# Patient Record
Sex: FEMALE
[De-identification: names, ages, dates, MRNs, and addresses within clinical notes are randomized per-mention and may not be internally consistent; named-entity substitution may affect disease eponyms.]

---

## 2017-12-12 ENCOUNTER — HOSPITAL ENCOUNTER (OUTPATIENT)
Dept: HOSPITAL 39 - GMAB | Age: 82
Discharge: HOME | End: 2017-12-12
Attending: FAMILY MEDICINE
Payer: MEDICARE

## 2017-12-12 DIAGNOSIS — I10: Primary | ICD-10-CM

## 2017-12-18 ENCOUNTER — HOSPITAL ENCOUNTER (OUTPATIENT)
Dept: HOSPITAL 39 - MAMMO | Age: 82
Discharge: HOME | End: 2017-12-18
Attending: FAMILY MEDICINE
Payer: MEDICARE

## 2017-12-18 DIAGNOSIS — E04.1: ICD-10-CM

## 2017-12-18 DIAGNOSIS — I65.23: Primary | ICD-10-CM

## 2017-12-18 PROCEDURE — 93880 EXTRACRANIAL BILAT STUDY: CPT

## 2017-12-18 PROCEDURE — 77063 BREAST TOMOSYNTHESIS BI: CPT

## 2017-12-18 PROCEDURE — 76536 US EXAM OF HEAD AND NECK: CPT

## 2017-12-19 NOTE — US
EXAM DESCRIPTION: 



Carotid Duplex



CLINICAL HISTORY: 



82-year-old female with carotid bruit.



COMPARISON: 



None Available



TECHNIQUE: 



Color Doppler evaluation of the extracranial carotid



FINDINGS: 



Right carotid: By 2-D imaging, there is a moderate burden of

calcified plaque seen within the common carotid artery extending

into the right internal carotid and external carotid vessels.

Peak systolic velocity common carotid artery = 85.3 cm/s

Peak systolic velocity internal carotid artery = 106.4 cm/s

Peak systolic velocity external carotid artery = 123.3 cm/s

ICA CCA ratio 1.2

Left carotid: By 2-D imaging, there is a moderate burden of

calcified plaque seen within the common carotid artery/bulb

extending into the internal carotid artery.

Peak systolic velocity common carotid artery = 82.0 cm/s

Peak systolic velocity internal carotid artery = 76.6 cm/s 

Peak systolic velocity external carotid artery = 69.1 cm/s

ICA CCA ratio 0.9

Antegrade flow is seen in both vertebral arteries

Normal flow velocities and waveforms are demonstrated

bilaterally. 





IMPRESSION: 



Moderate burden of calcified atherosclerotic disease bilateral

common carotid arteries extending into the internal carotid

arteries. No hemodynamically significant rate limiting stenosis

is present by NASCET Criteria.



Electronically signed by:  Tanmay Chapman MD  12/19/2017 8:22 AM

[facility] Workstation: [id number]

## 2017-12-19 NOTE — US
Thyroid Ultrasound Examination



Clinical History:



82 years Female, NODULE



Comparison



[None. ]



Findings



Thyroid gland is normal in size. Multiple thyroid nodules are

present bilaterally. None of these thyroid nodules meet imaging

criteria for pathologic correlation. The largest is within the

left lobe measuring 1.9 cm. This dominant nodule is

well-circumscribed, spongiform and without internal

calcifications-benign. Thyroid isthmus is normal in thickness.



Cervical lymphadenopathy:None. 



Impression



Multinodular goiter.

No concerning thyroid nodules in need of follow-up or pathologic

correlation.



Electronically signed by:  Tanmay Chapman MD  12/19/2017 8:26 AM

[facility] Workstation: [id number]

## 2018-08-27 ENCOUNTER — HOSPITAL ENCOUNTER (OUTPATIENT)
Dept: HOSPITAL 39 - AMB | Age: 83
Discharge: HOME | End: 2018-08-27
Attending: OPHTHALMOLOGY
Payer: MEDICARE

## 2018-08-27 DIAGNOSIS — H26.492: Primary | ICD-10-CM

## 2019-03-06 ENCOUNTER — HOSPITAL ENCOUNTER (OUTPATIENT)
Dept: HOSPITAL 39 - MAMMO | Age: 84
End: 2019-03-06
Attending: FAMILY MEDICINE
Payer: MEDICARE

## 2019-03-06 DIAGNOSIS — I10: ICD-10-CM

## 2019-03-06 DIAGNOSIS — Z12.31: Primary | ICD-10-CM

## 2019-03-07 NOTE — MAM
EXAM DESCRIPTION: 

3D Screening BILATERAL : Digital Mammography.



CLINICAL HISTORY: 

84 years Female SCREENING . No complaints. No personal or family

history of breast cancer. No childbirth. Postmenopausal. No HRT. 

Lifetime risk of developing breast cancer (Tyrer-Cuzick

model)(%):  1.9.



COMPARISON: 

Bilateral screening digital breast tomosynthesis 12/18/2017.  



TECHNIQUE: 

Bilateral CC and MLO projection full-field images, digital

tomosynthesis mammographic technique. Bilateral digital 2-D

full-field MLO images. CAD not available for tomosynthesis or 2-D

images. Technically difficult study due to positioning of left

breast and axilla on the left.



FINDINGS: 

The breast parenchymal density pattern is:  Scattered areas of

fibroglandular density.   No skin thickening or nipple

retraction.  Bilateral solitary microcalcifications. Focal

asymmetry retroareolar right breast.  Stable since the prior

study.

No new focal, stellate mass or density, focal asymmetry , and no

suspicious microcalcifications bilaterally. Stable mammograms

compared to prior study. 



IMPRESSION: 

Benign exam.



BIRAD CATEGORY: 2 BENIGN FINDINGS.



RECOMMENDATIONS:

FOLLOW UP: Routine digital bilateral  mammographic screening, one

year interval from  March 2019.



Written communication explaining the IMPRESSION and follow-up,

will be mailed to the patient and referring health care provider.



According to the [employer], yearly mammograms

are recommended starting at age 40 and continuing as long as a

woman is in good health.  Any breast change noted on a breast

self-exam should be reported promptly to the patient's healthcare

provider.  Breast MRI is recommended for women with an

approximately 20-25% or greater lifetime risk of breast cancer,

including women with a strong family history of breast or ovarian

cancer and women who have been treated for Hodgkin's disease.



A negative mammographic report should not delay tissue diagnosis

in patients with significant clinical history or physical

findings.



Extremely dense breast tissue limits the sensitivity of digital

mammography. 



Electronically signed by:  Umesh Grant MD  3/7/2019 11:33 AM CST

Workstation: [id number]

## 2020-05-11 ENCOUNTER — HOSPITAL ENCOUNTER (OUTPATIENT)
Dept: HOSPITAL 39 - MAMMO | Age: 85
End: 2020-05-11
Attending: FAMILY MEDICINE
Payer: MEDICARE

## 2020-05-11 DIAGNOSIS — Z12.31: Primary | ICD-10-CM

## 2020-05-13 NOTE — MAM
EXAM DESCRIPTION: 

3D Screening BILATERAL : Digital Mammography.



CLINICAL HISTORY: 

85 years Female ANNUAL SCREENING . No complaints. No personal or

family history of breast cancer. Menarche age unknown. No

childbirth. Menopause age unknown. No HRT. Lifetime risk of

developing breast cancer (Tyrer-Cuzick model)(%):  1.4.



COMPARISON: 

Bilateral screening digital breast tomosynthesis March 2019 and

December 2017.  



TECHNIQUE: 

Bilateral CC and MLO projection full-field images,  digital

tomosynthesis mammographic technique.  Bilateral digital 2-D

full-field MLO images.   CAD available for 2-D images.  



FINDINGS: 

The breast parenchymal density pattern is:  Scattered areas of

fibroglandular density.   No skin thickening or nipple

retraction.  Bilateral skin and parenchymal solitary

microcalcifications. Bilateral vascular calcifications. Stable

group of microcalcifications mid left breast.

No new focal, stellate mass or density, focal asymmetry , and no

suspicious microcalcifications bilaterally. Stable mammograms

compared to prior study. 



IMPRESSION: 

Benign exam.



BIRAD CATEGORY: 2 BENIGN FINDINGS.



RECOMMENDATIONS:

FOLLOW UP: Routine digital bilateral  mammographic screening, one

year interval from  May 2020.



Written communication explaining the IMPRESSION and follow-up,

will be mailed to the patient and referring health care provider.



According to the [employer], yearly mammograms

are recommended starting at age 40 and continuing as long as a

woman is in good health.  Any breast change noted on a breast

self-exam should be reported promptly to the patient's healthcare

provider.  Breast MRI is recommended for women with an

approximately 20-25% or greater lifetime risk of breast cancer,

including women with a strong family history of breast or ovarian

cancer and women who have been treated for Hodgkin's disease.



A negative mammographic report should not delay tissue diagnosis

in patients with significant clinical history or physical

findings.



Extremely dense breast tissue limits the sensitivity of digital

mammography. 



Electronically signed by:  Umesh Grant MD  5/13/2020 3:51 PM CDT

Workstation: [id number]

## 2020-05-28 ENCOUNTER — HOSPITAL ENCOUNTER (OUTPATIENT)
Dept: HOSPITAL 39 - GMAE | Age: 85
End: 2020-05-28
Attending: FAMILY MEDICINE
Payer: MEDICARE

## 2020-05-28 DIAGNOSIS — E78.2: ICD-10-CM

## 2020-05-28 DIAGNOSIS — I10: ICD-10-CM

## 2020-05-28 DIAGNOSIS — E04.1: Primary | ICD-10-CM
